# Patient Record
Sex: MALE | Race: WHITE | URBAN - METROPOLITAN AREA
[De-identification: names, ages, dates, MRNs, and addresses within clinical notes are randomized per-mention and may not be internally consistent; named-entity substitution may affect disease eponyms.]

---

## 2019-06-13 ENCOUNTER — APPOINTMENT (OUTPATIENT)
Dept: CT IMAGING | Facility: CLINIC | Age: 64
End: 2019-06-13
Attending: EMERGENCY MEDICINE
Payer: COMMERCIAL

## 2019-06-13 ENCOUNTER — HOSPITAL ENCOUNTER (OUTPATIENT)
Facility: CLINIC | Age: 64
Setting detail: OBSERVATION
Discharge: HOME | End: 2019-06-13
Attending: EMERGENCY MEDICINE | Admitting: INTERNAL MEDICINE
Payer: COMMERCIAL

## 2019-06-13 VITALS
HEIGHT: 70 IN | TEMPERATURE: 97.8 F | OXYGEN SATURATION: 97 % | SYSTOLIC BLOOD PRESSURE: 128 MMHG | WEIGHT: 185 LBS | RESPIRATION RATE: 16 BRPM | BODY MASS INDEX: 26.48 KG/M2 | DIASTOLIC BLOOD PRESSURE: 83 MMHG | HEART RATE: 53 BPM

## 2019-06-13 DIAGNOSIS — N20.0 KIDNEY STONE: ICD-10-CM

## 2019-06-13 LAB
ALBUMIN UR-MCNC: NEGATIVE MG/DL
ANION GAP SERPL CALCULATED.3IONS-SCNC: 7 MMOL/L (ref 3–14)
APPEARANCE UR: CLEAR
BASOPHILS # BLD AUTO: 0 10E9/L (ref 0–0.2)
BASOPHILS NFR BLD AUTO: 0.4 %
BILIRUB UR QL STRIP: NEGATIVE
BUN SERPL-MCNC: 28 MG/DL (ref 7–30)
CALCIUM SERPL-MCNC: 8.8 MG/DL (ref 8.5–10.1)
CHLORIDE SERPL-SCNC: 106 MMOL/L (ref 94–109)
CO2 SERPL-SCNC: 27 MMOL/L (ref 20–32)
COLOR UR AUTO: YELLOW
CREAT SERPL-MCNC: 1.47 MG/DL (ref 0.66–1.25)
DIFFERENTIAL METHOD BLD: NORMAL
EOSINOPHIL # BLD AUTO: 0.1 10E9/L (ref 0–0.7)
EOSINOPHIL NFR BLD AUTO: 1.5 %
ERYTHROCYTE [DISTWIDTH] IN BLOOD BY AUTOMATED COUNT: 13.5 % (ref 10–15)
GFR SERPL CREATININE-BSD FRML MDRD: 50 ML/MIN/{1.73_M2}
GLUCOSE SERPL-MCNC: 103 MG/DL (ref 70–99)
GLUCOSE UR STRIP-MCNC: NEGATIVE MG/DL
HCT VFR BLD AUTO: 42 % (ref 40–53)
HGB BLD-MCNC: 14.3 G/DL (ref 13.3–17.7)
HGB UR QL STRIP: ABNORMAL
IMM GRANULOCYTES # BLD: 0 10E9/L (ref 0–0.4)
IMM GRANULOCYTES NFR BLD: 0.1 %
KETONES UR STRIP-MCNC: NEGATIVE MG/DL
LEUKOCYTE ESTERASE UR QL STRIP: NEGATIVE
LYMPHOCYTES # BLD AUTO: 2.9 10E9/L (ref 0.8–5.3)
LYMPHOCYTES NFR BLD AUTO: 34.2 %
MCH RBC QN AUTO: 29.7 PG (ref 26.5–33)
MCHC RBC AUTO-ENTMCNC: 34 G/DL (ref 31.5–36.5)
MCV RBC AUTO: 87 FL (ref 78–100)
MONOCYTES # BLD AUTO: 1.1 10E9/L (ref 0–1.3)
MONOCYTES NFR BLD AUTO: 12.5 %
MUCOUS THREADS #/AREA URNS LPF: PRESENT /LPF
NEUTROPHILS # BLD AUTO: 4.4 10E9/L (ref 1.6–8.3)
NEUTROPHILS NFR BLD AUTO: 51.3 %
NITRATE UR QL: NEGATIVE
PH UR STRIP: 5 PH (ref 5–7)
PLATELET # BLD AUTO: 304 10E9/L (ref 150–450)
POTASSIUM SERPL-SCNC: 4 MMOL/L (ref 3.4–5.3)
RBC # BLD AUTO: 4.82 10E12/L (ref 4.4–5.9)
RBC #/AREA URNS AUTO: 53 /HPF (ref 0–2)
SODIUM SERPL-SCNC: 140 MMOL/L (ref 133–144)
SOURCE: ABNORMAL
SP GR UR STRIP: 1.03 (ref 1–1.03)
UROBILINOGEN UR STRIP-MCNC: NORMAL MG/DL (ref 0–2)
WBC # BLD AUTO: 8.6 10E9/L (ref 4–11)
WBC #/AREA URNS AUTO: 1 /HPF (ref 0–5)

## 2019-06-13 PROCEDURE — 25000128 H RX IP 250 OP 636: Performed by: STUDENT IN AN ORGANIZED HEALTH CARE EDUCATION/TRAINING PROGRAM

## 2019-06-13 PROCEDURE — 25000132 ZZH RX MED GY IP 250 OP 250 PS 637: Performed by: INTERNAL MEDICINE

## 2019-06-13 PROCEDURE — 80048 BASIC METABOLIC PNL TOTAL CA: CPT | Performed by: EMERGENCY MEDICINE

## 2019-06-13 PROCEDURE — 96376 TX/PRO/DX INJ SAME DRUG ADON: CPT

## 2019-06-13 PROCEDURE — 81001 URINALYSIS AUTO W/SCOPE: CPT | Performed by: EMERGENCY MEDICINE

## 2019-06-13 PROCEDURE — 85025 COMPLETE CBC W/AUTO DIFF WBC: CPT | Performed by: EMERGENCY MEDICINE

## 2019-06-13 PROCEDURE — 99285 EMERGENCY DEPT VISIT HI MDM: CPT | Mod: 25

## 2019-06-13 PROCEDURE — 96374 THER/PROPH/DIAG INJ IV PUSH: CPT

## 2019-06-13 PROCEDURE — G0378 HOSPITAL OBSERVATION PER HR: HCPCS

## 2019-06-13 PROCEDURE — 25000128 H RX IP 250 OP 636: Performed by: INTERNAL MEDICINE

## 2019-06-13 PROCEDURE — 99220 ZZC INITIAL OBSERVATION CARE,LEVL III: CPT | Performed by: INTERNAL MEDICINE

## 2019-06-13 PROCEDURE — 25000128 H RX IP 250 OP 636: Performed by: EMERGENCY MEDICINE

## 2019-06-13 PROCEDURE — 74176 CT ABD & PELVIS W/O CONTRAST: CPT

## 2019-06-13 PROCEDURE — 96375 TX/PRO/DX INJ NEW DRUG ADDON: CPT

## 2019-06-13 PROCEDURE — 25000132 ZZH RX MED GY IP 250 OP 250 PS 637: Performed by: EMERGENCY MEDICINE

## 2019-06-13 RX ORDER — OXYCODONE AND ACETAMINOPHEN 5; 325 MG/1; MG/1
1-2 TABLET ORAL EVERY 4 HOURS PRN
Qty: 20 TABLET | Refills: 0 | Status: SHIPPED | OUTPATIENT
Start: 2019-06-13 | End: 2019-06-13

## 2019-06-13 RX ORDER — SODIUM PHOSPHATE,MONO-DIBASIC 19G-7G/118
1 ENEMA (ML) RECTAL DAILY
COMMUNITY

## 2019-06-13 RX ORDER — ACETAMINOPHEN 325 MG/1
650 TABLET ORAL EVERY 4 HOURS PRN
Status: DISCONTINUED | OUTPATIENT
Start: 2019-06-13 | End: 2019-06-13 | Stop reason: HOSPADM

## 2019-06-13 RX ORDER — NALOXONE HYDROCHLORIDE 0.4 MG/ML
.1-.4 INJECTION, SOLUTION INTRAMUSCULAR; INTRAVENOUS; SUBCUTANEOUS
Status: DISCONTINUED | OUTPATIENT
Start: 2019-06-13 | End: 2019-06-13 | Stop reason: HOSPADM

## 2019-06-13 RX ORDER — ACETAMINOPHEN 500 MG
TABLET ORAL PRN
COMMUNITY

## 2019-06-13 RX ORDER — MORPHINE SULFATE 4 MG/ML
4 INJECTION, SOLUTION INTRAMUSCULAR; INTRAVENOUS
Status: DISCONTINUED | OUTPATIENT
Start: 2019-06-13 | End: 2019-06-13

## 2019-06-13 RX ORDER — CLOPIDOGREL BISULFATE 75 MG/1
75 TABLET ORAL DAILY
COMMUNITY

## 2019-06-13 RX ORDER — MORPHINE SULFATE 4 MG/ML
4 INJECTION, SOLUTION INTRAMUSCULAR; INTRAVENOUS ONCE
Status: COMPLETED | OUTPATIENT
Start: 2019-06-13 | End: 2019-06-13

## 2019-06-13 RX ORDER — AMOXICILLIN 250 MG
1 CAPSULE ORAL 2 TIMES DAILY
Status: DISCONTINUED | OUTPATIENT
Start: 2019-06-13 | End: 2019-06-13 | Stop reason: HOSPADM

## 2019-06-13 RX ORDER — LEVOTHYROXINE SODIUM 125 UG/1
125 TABLET ORAL DAILY
COMMUNITY

## 2019-06-13 RX ORDER — OXYCODONE AND ACETAMINOPHEN 5; 325 MG/1; MG/1
1-2 TABLET ORAL EVERY 4 HOURS PRN
Status: DISCONTINUED | OUTPATIENT
Start: 2019-06-13 | End: 2019-06-13 | Stop reason: HOSPADM

## 2019-06-13 RX ORDER — ATENOLOL 25 MG/1
25 TABLET ORAL DAILY
COMMUNITY

## 2019-06-13 RX ORDER — TAMSULOSIN HYDROCHLORIDE 0.4 MG/1
0.4 CAPSULE ORAL DAILY
Status: DISCONTINUED | OUTPATIENT
Start: 2019-06-13 | End: 2019-06-13 | Stop reason: HOSPADM

## 2019-06-13 RX ORDER — ONDANSETRON 4 MG/1
4 TABLET, ORALLY DISINTEGRATING ORAL EVERY 6 HOURS PRN
Status: DISCONTINUED | OUTPATIENT
Start: 2019-06-13 | End: 2019-06-13 | Stop reason: HOSPADM

## 2019-06-13 RX ORDER — MORPHINE SULFATE 2 MG/ML
2-4 INJECTION, SOLUTION INTRAMUSCULAR; INTRAVENOUS
Status: DISCONTINUED | OUTPATIENT
Start: 2019-06-13 | End: 2019-06-13 | Stop reason: HOSPADM

## 2019-06-13 RX ORDER — TAMSULOSIN HYDROCHLORIDE 0.4 MG/1
0.4 CAPSULE ORAL ONCE
Status: COMPLETED | OUTPATIENT
Start: 2019-06-13 | End: 2019-06-13

## 2019-06-13 RX ORDER — SODIUM CHLORIDE 9 MG/ML
INJECTION, SOLUTION INTRAVENOUS CONTINUOUS
Status: DISCONTINUED | OUTPATIENT
Start: 2019-06-13 | End: 2019-06-13 | Stop reason: HOSPADM

## 2019-06-13 RX ORDER — ONDANSETRON 2 MG/ML
4 INJECTION INTRAMUSCULAR; INTRAVENOUS EVERY 30 MIN PRN
Status: DISCONTINUED | OUTPATIENT
Start: 2019-06-13 | End: 2019-06-13

## 2019-06-13 RX ORDER — LIDOCAINE 40 MG/G
CREAM TOPICAL
Status: DISCONTINUED | OUTPATIENT
Start: 2019-06-13 | End: 2019-06-13 | Stop reason: HOSPADM

## 2019-06-13 RX ORDER — ONDANSETRON 2 MG/ML
4 INJECTION INTRAMUSCULAR; INTRAVENOUS EVERY 6 HOURS PRN
Status: DISCONTINUED | OUTPATIENT
Start: 2019-06-13 | End: 2019-06-13 | Stop reason: HOSPADM

## 2019-06-13 RX ORDER — OXYCODONE AND ACETAMINOPHEN 5; 325 MG/1; MG/1
1-2 TABLET ORAL EVERY 4 HOURS PRN
Qty: 25 TABLET | Refills: 0 | Status: SHIPPED | OUTPATIENT
Start: 2019-06-13 | End: 2019-06-13

## 2019-06-13 RX ORDER — KETOROLAC TROMETHAMINE 15 MG/ML
15 INJECTION, SOLUTION INTRAMUSCULAR; INTRAVENOUS ONCE
Status: COMPLETED | OUTPATIENT
Start: 2019-06-13 | End: 2019-06-13

## 2019-06-13 RX ORDER — AMOXICILLIN 250 MG
2 CAPSULE ORAL 2 TIMES DAILY
Status: DISCONTINUED | OUTPATIENT
Start: 2019-06-13 | End: 2019-06-13 | Stop reason: HOSPADM

## 2019-06-13 RX ORDER — ACETAMINOPHEN 650 MG/1
650 SUPPOSITORY RECTAL EVERY 4 HOURS PRN
Status: DISCONTINUED | OUTPATIENT
Start: 2019-06-13 | End: 2019-06-13 | Stop reason: HOSPADM

## 2019-06-13 RX ORDER — POLYETHYLENE GLYCOL 3350 17 G/17G
1 POWDER, FOR SOLUTION ORAL DAILY
COMMUNITY

## 2019-06-13 RX ORDER — ALLOPURINOL 300 MG/1
150 TABLET ORAL DAILY
COMMUNITY

## 2019-06-13 RX ORDER — MULTIVIT WITH MINERALS/LUTEIN
1 TABLET ORAL DAILY
COMMUNITY

## 2019-06-13 RX ORDER — ONDANSETRON 2 MG/ML
4 INJECTION INTRAMUSCULAR; INTRAVENOUS ONCE
Status: COMPLETED | OUTPATIENT
Start: 2019-06-13 | End: 2019-06-13

## 2019-06-13 RX ORDER — HYDROMORPHONE HYDROCHLORIDE 1 MG/ML
0.5 INJECTION, SOLUTION INTRAMUSCULAR; INTRAVENOUS; SUBCUTANEOUS
Status: DISCONTINUED | OUTPATIENT
Start: 2019-06-13 | End: 2019-06-13

## 2019-06-13 RX ORDER — OXYCODONE AND ACETAMINOPHEN 5; 325 MG/1; MG/1
1-2 TABLET ORAL EVERY 6 HOURS PRN
Qty: 25 TABLET | Refills: 0 | Status: SHIPPED | OUTPATIENT
Start: 2019-06-13

## 2019-06-13 RX ADMIN — HYDROMORPHONE HYDROCHLORIDE 0.5 MG: 1 INJECTION, SOLUTION INTRAMUSCULAR; INTRAVENOUS; SUBCUTANEOUS at 02:08

## 2019-06-13 RX ADMIN — KETOROLAC TROMETHAMINE 15 MG: 15 INJECTION, SOLUTION INTRAMUSCULAR; INTRAVENOUS at 13:03

## 2019-06-13 RX ADMIN — OXYCODONE HYDROCHLORIDE AND ACETAMINOPHEN 2 TABLET: 5; 325 TABLET ORAL at 11:20

## 2019-06-13 RX ADMIN — KETOROLAC TROMETHAMINE 15 MG: 15 INJECTION, SOLUTION INTRAMUSCULAR; INTRAVENOUS at 00:36

## 2019-06-13 RX ADMIN — MORPHINE SULFATE 4 MG: 2 INJECTION, SOLUTION INTRAMUSCULAR; INTRAVENOUS at 05:48

## 2019-06-13 RX ADMIN — MORPHINE SULFATE 4 MG: 4 INJECTION INTRAVENOUS at 04:15

## 2019-06-13 RX ADMIN — TAMSULOSIN HYDROCHLORIDE 0.4 MG: 0.4 CAPSULE ORAL at 02:31

## 2019-06-13 RX ADMIN — MORPHINE SULFATE 4 MG: 4 INJECTION INTRAVENOUS at 00:37

## 2019-06-13 RX ADMIN — ONDANSETRON 4 MG: 2 INJECTION INTRAMUSCULAR; INTRAVENOUS at 00:37

## 2019-06-13 RX ADMIN — TAMSULOSIN HYDROCHLORIDE 0.4 MG: 0.4 CAPSULE ORAL at 08:37

## 2019-06-13 RX ADMIN — SENNOSIDES AND DOCUSATE SODIUM 1 TABLET: 8.6; 5 TABLET ORAL at 08:37

## 2019-06-13 RX ADMIN — MORPHINE SULFATE 4 MG: 4 INJECTION INTRAVENOUS at 02:31

## 2019-06-13 RX ADMIN — OXYCODONE HYDROCHLORIDE AND ACETAMINOPHEN 2 TABLET: 5; 325 TABLET ORAL at 06:41

## 2019-06-13 ASSESSMENT — ENCOUNTER SYMPTOMS
DYSURIA: 0
HEMATURIA: 0
NAUSEA: 0
FLANK PAIN: 1
VOMITING: 0

## 2019-06-13 ASSESSMENT — MIFFLIN-ST. JEOR: SCORE: 1645.4

## 2019-06-13 NOTE — ED PROVIDER NOTES
..RECEIVING UNIT ED HANDOFF REVIEW    ED Nurse Handoff Report was reviewed by: Gudelia Aaron on June 13, 2019 at 3:26 AM

## 2019-06-13 NOTE — PLAN OF CARE
Pt. A&o, vss, up independently, taking percocet for pain, discharge instruction reviewed, discharge medication given and pt. discharge to home at 143 pm.

## 2019-06-13 NOTE — ED PROVIDER NOTES
"  History     Chief Complaint:  Flank pain     HPI   Jose Juan Araujo is a 63 year old male with a history of kidney stones, diverticulitis, myocardial infarction, among others who presents with flank pain. The patient noticed sharp left sided flank pain 1.5 hours ago that he is pretty sure is a kidney stone. He originally thought it might be diverticulitis but as the pain went on it reminded him of his past kidney stones. Due to concern, the patient visited the ED for evaluation and treatment. Upon arrival, the patient states he has had normal bowel movements.  He has had some nausea but no vomiting.  The patient denies dysuria or hematuria. The patient is from [place] and is here in town for work. He had a colectomy done this last November for diverticulosis and a kidney stone this last October.    Allergies:  The patient has no known drug allergies.    Medications:    Plavix  Others but not listed in EHR*    Past Medical History:    Myocardial infarction   Kidney stones  Diverticulitis  Others but not listed in EHR*    Past Surgical History:    3 cardiac stents   Colectomy    Family History:    No past pertinent family history.    Social History:  Marital Status:  Unknown   Smoker:   Unknown   Alcohol:   Unknown   Drugs:   Unknown      Review of Systems   Gastrointestinal: Negative for nausea and vomiting.   Genitourinary: Positive for flank pain. Negative for dysuria and hematuria.   All other systems reviewed and are negative.    Physical Exam     Patient Vitals for the past 24 hrs:   BP Temp Temp src Pulse Heart Rate Resp SpO2 Height Weight   06/13/19 0200 -- -- -- -- -- -- 96 % -- --   06/13/19 0110 124/79 -- -- 57 -- -- 95 % -- --   06/13/19 0023 134/83 98.1  F (36.7  C) Temporal 74 74 20 98 % 1.778 m (5' 10\") 83.9 kg (185 lb)     Physical Exam  Nursing note and vitals reviewed.  Constitutional:  Oriented to person, place, and time. Cooperative.  Appears uncomfortable.  HENT:   Nose:    Nose normal. "   Mouth/Throat:   Mucous membranes are normal.   Eyes:    Conjunctivae normal and EOM are normal.      Pupils are equal, round, and reactive to light.   Neck:    Trachea normal.   Cardiovascular:  Normal rate, regular rhythm, normal heart sounds and normal pulses. No murmur heard.  Pulmonary/Chest:  Effort normal and breath sounds normal.   Abdominal:   Soft. Normal appearance and bowel sounds are normal.      There is some tenderness to palpation in the left lower quadrant as well as some left CVA tenderness to palpation.  There is no rebound.   Musculoskeletal:  Extremities atraumatic x 4.   Lymphadenopathy:  No cervical adenopathy.   Neurological:   Alert and oriented to person, place, and time. Normal strength.      No cranial nerve deficit or sensory deficit. GCS eye subscore is 4. GCS verbal subscore is 5. GCS motor subscore is 6.   Skin:    Skin is intact. No rash noted.   Psychiatric:   Normal mood and affect.    Emergency Department Course   Imaging:  Radiographic findings were communicated with the patient who voiced understanding of the findings.    CT Abdomen/Pelvis with IV contrast:   1. There is a 0.5 cm mid left ureteral stone causing mild  hydronephrosis.  2. Bilateral intrarenal stones.  3. Colonic diverticula without acute diverticulitis. as per radiology.    Laboratory:  UA with Microscopic: blood moderate, RBC 53, mucous present, o/w WNL  BMP: Glucose 103, creatinine 1.47, GFR 50, o/w WNL   CBC: WBC: 8.6, HGB: 14.3, PLT: 304    Interventions:  0036: Toradol 15 mg IV  0037: Morphine 4 mg IV  0037: Zofran 4 mg IV  0208: Dilaudid 0.5 mg IV  0231: Flomax 0.4 mg PO  0231: Morphine 4 mg IV    Emergency Department Course:  0043 I performed an exam of the patient as documented above.   (0230)  I consulted with Dr. Longo of the hospitalist services. They are in agreement to accept the patient for admission.  0235 I rechecked the patient and discussed the results of their workup thus far.    Findings and  plan explained to the Patient who consents to admission. Discussed the patient with Dr. Longo, who will admit the patient to a OBS bed for further monitoring, evaluation, and treatment.    Impression & Plan    Medical Decision Making:  This is a 63-year-old male came in with left flank pain.  Based on his presentation as well as his history, I felt it was likely that a kidney stone will be the cause of his pain.  I proceeded with the above work-up, and he does in fact have a kidney stone as a cause of his symptoms.  He is quite uncomfortable here and is from out of town.  He prefers to stay overnight for pain control, which I think is reasonable.  I subsequently spoke with Dr. Longo, who will be taking care of the patient.    Diagnosis:    ICD-10-CM    1. 5 mm Mid left ureteral kidney stone N20.0    2. Multiple Intra-renal kidney stones N20.0      Disposition:  Admitted to OBS    Scribe Disclosure:  I, Chuck Maldonado, am serving as a scribe on 6/13/2019 at 12:43 AM to personally document services performed by Bernard Charles MD based on my observations and the provider's statements to me.     Chuck Maldonado  6/13/2019    EMERGENCY DEPARTMENT       Bernard Charles MD  06/13/19 0439

## 2019-06-13 NOTE — PLAN OF CARE
Patient Arrived from ED @ 04:40 on the floor. VSS on RA. Up independently . A&O x4. Iv infusing. BS active, NPO . C/o flank pain 6/10 received Iv morphine . Percocet also available. Denies nausea . Will continue monitoring.

## 2019-06-13 NOTE — DISCHARGE SUMMARY
[facility]  Hospitalist Discharge Summary       Date of Admission:  6/13/2019  Date of Discharge:  6/13/2019  Discharging Provider: Wilver Valdez MD      Discharge Diagnoses     Nephrolithiasis    Follow-ups Needed After Discharge   Follow-up Appointments     Follow-up and recommended labs and tests       Follow up with primary care provider, Physician No Ref-Primary, within 7   days for hospital follow- up.  The following labs/tests are recommended:   None.           Unresulted Labs Ordered in the Past 30 Days of this Admission     No orders found from 4/14/2019 to 6/14/2019.        Discharge Disposition   Discharged to home  Condition at discharge: Stable    Hospital Course       Mr. Jose Juan Araujo is a very pleasant 63-year-old gentleman with a past medical history of hypertension, dyslipidemia, coronary artery disease with history of myocardial infarction and stent placement in 2000, history of gout, recurrent kidney stones, status post lithotripsies and stent placement, history of recurrent diverticulitis, status post colectomy and chronic kidney disease, stage III, who came in for evaluation of left lower quadrant pain.      PLAN:   1.  Renal colic.   2.  Obstructing left ureteral stone.   3.  Mild left hydronephrosis.   4.  Bilateral intrarenal stones.   -- He has history of recurrent stones.  Apparently, he underwent lithotripsy and stent placement in the past.  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  He started having left lower quadrant pain, which he initially thought it was a bout of diverticulitis, but the pain became severe and excruciating, more similar to his prior kidney stones/colic.  CT of the abdomen and pelvis did not show any diverticulitis, but it did show 0.5 cm mid left ureteral stone causing mild hydronephrosis.  His UA shows some microscopic hematuria, but no evidence of infection.  He received a few doses of morphine and Dilaudid in the ER which  improved his pain.  Urology consult requested, felt okay to be discharged with urology follow up in [place]. Percocet at discharge.    5.  Hypertension.   6.  Dyslipidemia.     7.  History of coronary artery disease with a history of MI and stents placed in the past.  He had been maintained on aspirin and Plavix, as well as atenolol and Repatha.  The patient denies any cardiac symptoms at this time.  No chest pain, no shortness of breath.  The stents had been placed back in 2000. Resume ASA/Plavix    8.  Hypothyroidism:Resume prior to admission levothyroxine    9.  Chronic kidney disease, stage III:  His creatinine now is 1.47.  As per Care Everywhere, his prior creatinine level varied between 1.6-1.9, so currently his creatinine is at baseline.  He does have mild left hydronephrosis on CAT scan. Cr wnl on discharge.  10.  History of gout: Resume prior to admission allopurinol at this time.   11.  History of recurrent diverticulitis:  He did present with pain in his left lower quadrant, but a CT of the abdomen and pelvis did not show any evidence of diverticulitis.       Consultations This Hospital Stay   UROLOGY IP CONSULT  UROLOGY IP CONSULT  UROLOGY IP CONSULT    Code Status   Full Code    Time Spent on this Encounter   I, Wilver Valdez, personally saw the patient today and spent greater than 30 minutes discharging this patient.       Wilver Valdez MD  [facility]  ______________________________________________________________________    Physical Exam   Vital Signs: Temp: 97.8  F (36.6  C) Temp src: Oral BP: 128/83 Pulse: 53 Heart Rate: 53 Resp: 16 SpO2: 97 % O2 Device: None (Room air)    Weight: 185 lbs 0 oz    Primary Care Physician   Physician No Ref-Primary    Discharge Orders      Reason for your hospital stay    You had a kidney stone and needed a Urology consult     Follow-up and recommended labs and tests     Follow up with primary care provider, Physician No Ref-Primary, within 7 days for  hospital follow- up.  The following labs/tests are recommended: None.     Activity    Your activity upon discharge: activity as tolerated     Diet    Follow this diet upon discharge: Orders Placed This Encounter      Regular Diet Adult       Significant Results and Procedures   Most Recent 3 CBC's:  Recent Labs   Lab Test 06/13/19  0030   WBC 8.6   HGB 14.3   MCV 87        Most Recent 3 BMP's:  Recent Labs   Lab Test 06/13/19  0030      POTASSIUM 4.0   CHLORIDE 106   CO2 27   BUN 28   CR 1.47*   ANIONGAP 7   IDANIA 8.8   *     Most Recent 2 LFT's:No lab results found.  Most Recent 3 INR's:No lab results found.  Most Recent Urinalysis:  Recent Labs   Lab Test 06/13/19  0103   COLOR Yellow   APPEARANCE Clear   URINEGLC Negative   URINEBILI Negative   URINEKETONE Negative   SG 1.026   UBLD Moderate*   URINEPH 5.0   PROTEIN Negative   NITRITE Negative   LEUKEST Negative   RBCU 53*   WBCU 1   ,   Results for orders placed or performed during the hospital encounter of 06/13/19   CT Abdomen Pelvis w/o Contrast    Narrative    CT ABDOMEN PELVIS W/O CONTRAST  6/13/2019 1:56 AM     HISTORY: Flank pain, stone disease suspected.    TECHNIQUE: Noncontrast CT abdomen and pelvis was performed. Radiation  dose for this scan was reduced using automated exposure control,  adjustment of the mA and/or kV according to patient size, or iterative  reconstruction technique.    COMPARISON: None.    FINDINGS:  Abdomen: There is mild dilatation of the left renal collecting system  and proximal ureter caused by a 0.5 cm mid ureteral stone at the level  of the iliac crests. There are five stones within the left kidney  measuring up to 0.5 cm. There are four tiny stones in the right kidney  measuring up to 0.2 cm. No right ureteral stone or hydronephrosis.    The lung bases are unremarkable. Evaluation of the solid abdominal  organs is limited by the lack of intravenous contrast. The liver,  spleen, gallbladder, pancreas and  adrenal glands are normal in  appearance. There is no abdominal or pelvic lymph node enlargement.    Pelvis: Postoperative changes in the sigmoid colon. Colonic  diverticula without acute diverticulitis. No bowel obstruction or  inflammation. No free intraperitoneal gas or fluid. Degenerative  disease in the spine.      Impression    IMPRESSION:  1. There is a 0.5 cm mid left ureteral stone causing mild  hydronephrosis.  2. Bilateral intrarenal stones.  3. Colonic diverticula without acute diverticulitis.    KENYA BLACKBURN MD       Discharge Medications   Current Discharge Medication List      START taking these medications    Details   oxyCODONE-acetaminophen (PERCOCET) 5-325 MG tablet Take 1-2 tablets by mouth every 6 hours as needed for pain  Qty: 25 tablet, Refills: 0    Associated Diagnoses: Kidney stone         CONTINUE these medications which have NOT CHANGED    Details   acetaminophen (TYLENOL) 500 MG tablet Take by mouth as needed for mild pain      allopurinol (ZYLOPRIM) 300 MG tablet Take 150 mg by mouth daily      ASCORBIC ACID PO Take by mouth daily      aspirin (ASA) 325 MG EC tablet Take 325 mg by mouth daily      atenolol (TENORMIN) 25 MG tablet Take 25 mg by mouth daily      clopidogrel (PLAVIX) 75 MG tablet Take 75 mg by mouth daily      Coenzyme Q10 (Q-10 CO-ENZYME PO) Take by mouth daily      evolocumab (REPATHA) 140 MG/ML prefilled syringe Inject 140 mg Subcutaneous every 14 days      glucosamine-chondroitin 500-400 MG CAPS per capsule Take 1 capsule by mouth daily      hypromellose-dextran (ARTIFICAL TEARS) 0.1-0.3 % ophthalmic solution as needed for dry eyes      IRON PO Take by mouth daily      levothyroxine (SYNTHROID/LEVOTHROID) 125 MCG tablet Take 125 mcg by mouth daily      multivitamin (CENTRUM SILVER) tablet Take 1 tablet by mouth daily      polyethylene glycol (MIRALAX/GLYCOLAX) packet Take 1 packet by mouth daily      Probiotic Product (ALIGN PO) Take 1 capsule by mouth daily       ranitidine (ZANTAC) 150 MG tablet Take 150 mg by mouth daily      VITAMIN E PO Take by mouth daily           Allergies   No Known Allergies

## 2019-06-13 NOTE — H&P
Admitted:     06/13/2019      PRIMARY CARE PHYSICIAN:  Not in [facility].      CHIEF COMPLAINT:  Left lower abdominal pain.      HISTORY OF PRESENT ILLNESS:  Had been obtained from the patient, who is a good historian.  I also discussed with the ER attending, Dr. Charles.      Mr. Jose Juan Araujo is a very pleasant 63-year-old gentleman with past medical history of coronary artery disease, status post myocardial infarction and 3 stents placed many years ago, history of recurrent kidney stones, status post lithotripsy and stent placement, history of recurrent diverticulitis, status post partial colectomy and gout, who came in for evaluation of left lower quadrant pain.  The patient lives in [place].  He is here in the [facility] [employer].  He does have a prior history of kidney stones.  He states that last night he tried to go to bed around 8-9 p.m. when he started having some left lower quadrant pain that he initially thought was recurrent diverticulitis, but subsequently the pain got progressively worse.  It was constant, excruciating, 10/10 intensity, that was more similar to his prior kidney colics.  He did not take any medications at home.  Upon further questioning, he did report feeling some nausea, but he did not vomit.  He denies any fevers, no chest pain, no shortness of breath, no dizziness.  He denies any dysuria, no increased urinary frequency, no hematuria.  He denies any chest pain, no shortness of breath, no headaches, no leg swelling.      In ER, his initial vitals showed a blood pressure of 134/83, heart rate was 74, respiratory rate 20, oxygen saturation 98% on room air and temperature 98.1.  He was seen by Dr. Charles.  His initial blood work showed unremarkable CBC, no leukocytosis.  White blood cells of 8.6, hemoglobin 14.3, hematocrit 42, platelet count 304.  His BMP with a sodium 140, potassium 4, chloride 106, bicarb 27, BUN 20, creatinine 1.47, anion gap of 7, calcium 8.8.   His UA showed red blood cells of 53, moderate blood and white blood cells of 1, negative leukocyte esterase and negative nitrites.  He did have a CT of the abdomen and pelvis without contrast which showed a 0.5 cm mid left ureteral stone causing mild hydronephrosis.  There are bilateral intrarenal stones.  There is also colonic diverticula without acute diverticulitis.        In ER, he received some IV fluids, 1 dose of Dilaudid 0.5 mg IV and a couple of doses of morphine, 1 dose of Toradol, 1 dose of Zofran and 1 dose of Flomax 0.4 mg p.o.  The patient reported some improvement of the pain after he was given IV pain medications, but the pain recurred soon thereafter, the reason for which Hospitalist Service was called regarding the admission.      PAST MEDICAL HISTORY:   1.  History of recurrent kidney stones, status post lithotripsies and stent placement in the past.  He lives in [place].   2.  History of hypertension.   3.  Dyslipidemia.   4.  History of coronary artery disease with history of myocardial infarction and 3 stents placed in 2000.   5.  Gastroesophageal reflux disease.   6.  Hypothyroidism.   7.  Recurrent diverticulitis of the colon, status post laparoscopic sigmoid resection.   8.  History of gout.      PAST SURGICAL HISTORY:   1.  Angiogram with stent placement in 2000.   2.  Tonsillectomy.   3.  Left knee surgery in childhood.   4.  Cystoscopy with stent placement in the past and lithotripsy.      SOCIAL HISTORY:  He denies smoking.  He denies illicit drug abuse.  He drinks alcohol occasionally.      FAMILY HISTORY:  Reviewed and is noncontributory to the current admission.      PRIOR TO ADMISSION MEDICATIONS:  Needs to be verified by the Pharmacy.     1.  Aspirin 325 mg p.o. daily.   2.  Plavix 75 mg p.o. daily.   3.  Levothyroxine 25 mcg p.o. daily.   4.  Allopurinol 150 mg p.o. daily.   5.  Atenolol 25 mg p.o. daily.   6.  Repatha 140 mg p.o. daily.   7.  Prilosec 150 mg p.o. twice daily.       ALLERGIES:  NO KNOWN DRUG ALLERGIES.      REVIEW OF SYSTEMS:  A 10-point review of systems was conducted and it was negative, except for pertinent positives mentioned in history of present illness.      PHYSICAL EXAMINATION:   VITAL SIGNS:  Blood pressure is 124/79, heart rate 76, respiratory rate 16, oxygen saturation 96% on room air and temperature 98.1.   GENERAL:  The patient is awake, alert, no acute distress at the time of my examination.   HEENT:  Head is normocephalic, atraumatic.  Pupils are equal, round and reactive to light.  Oral mucosa is mildly dry.   NECK:  Supple.  No cervical lymphadenopathy, no thyromegaly.   CHEST:  There is bilateral air entry.  No wheezing, no rales, no crackles.   CARDIOVASCULAR:  There is normal S1, S2, regular rate and rhythm.  No murmurs, no rubs.   ABDOMEN:  Soft, mildly tender to palpation in left lower quadrant.  No rebound, no guarding.  Bowel sounds are present.   EXTREMITIES:  There is no leg swelling, no calf tenderness.  2+ peripheral pulses are palpable.   SKIN:  Intact.  No rash, no cyanosis.   NEUROLOGIC:  The patient is awake, alert, oriented to self, place and time.  There are no focal neurological deficits.   PSYCHIATRIC:  Normal mood, normal affect.   MUSCULOSKELETAL:  He moves all extremities freely.  There are no obvious joint deformities.      LABORATORY:  Reviewed and dictated above.      ASSESSMENT:  Mr. Jose Juan Araujo is a very pleasant 63-year-old gentleman with a past medical history of hypertension, dyslipidemia, coronary artery disease with history of myocardial infarction and stent placement in 2000, history of gout, recurrent kidney stones, status post lithotripsies and stent placement, history of recurrent diverticulitis, status post colectomy and chronic kidney disease, stage III, who came in for evaluation of left lower quadrant pain.      PLAN:   1.  Renal colic.   2.  Obstructing left ureteral stone.   3.  Mild left hydronephrosis.   4.   Bilateral intrarenal stones.   -- He has history of recurrent stones.  Apparently, he underwent lithotripsy and stent placement in the past.  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  He started having left lower quadrant pain, which he initially thought it was a bout of diverticulitis, but the pain became severe and excruciating, more similar to his prior kidney stones/colic.  CT of the abdomen and pelvis did not show any diverticulitis, but it did show 0.5 cm mid left ureteral stone causing mild hydronephrosis.  His UA shows some microscopic hematuria, but no evidence of infection.  He received a few doses of morphine and Dilaudid in the ER which improved his pain only temporarily.  The patient is admitted under observational status for pain control.  We will continue with aggressive IV hydration, normal saline at 125 mL per hour.  We will start him on Flomax, and he will have antiemetics and pain medications available p.r.n.   Order to strain the urine has been placed, hoping that he might pass the stone.  Urology consult requested.  The patient is hoping that the pain will be better controlled later on today so he can be discharged, and he is planning to return back to [place].   5.  Hypertension.   6.  Dyslipidemia.   7.  History of coronary artery disease with a history of MI and stents placed in the past.    He had been maintained on aspirin and Plavix, as well as atenolol and Repatha.  The patient denies any cardiac symptoms at this time.  No chest pain, no shortness of breath.  The stents had been placed back in 2000.  I think it is okay to hold his aspirin and Plavix at this time in case he may need to go to OR.  It is actually okay to hold all his medications, including Repatha.  Given his observational status, he will start taking his medications later on today if he will be discharged.  If he will stay in the hospital, please resume his prior to admission atenolol.   8.   Hypothyroidism:  I am holding his prior to admission levothyroxine, given observational status.  He will resume taking it after discharge.   9.  Chronic kidney disease, stage III:  His creatinine now is 1.47.  As per Care Everywhere, his prior creatinine level varied between 1.6-1.9, so currently his creatinine is at baseline.  He does have mild left hydronephrosis on CAT scan.  We will monitor his kidney function during this hospitalization.   10.  History of gout:  I am holding his prior to admission allopurinol at this time.   11.  History of recurrent diverticulitis:  He did present with pain in his left lower quadrant, but a CT of the abdomen and pelvis did not show any evidence of diverticulitis.     12.  Deep venous thrombosis prophylaxis:  Encourage ambulation as I anticipate a short hospital stay.   13.  Code status:  Discussed with the patient and patient is FULL CODE.   14.  Disposition:  Admit under observational status.  I anticipate patient may be discharged later on today or tomorrow, pending pain control and Urology recommendations.         GLORY SCHWARTZ MD             D: 2019   T: 2019   MT: JAVIER      Name:     DEJAH LE   MRN:      [id number]        Account:      [id number]   :      1955        Admitted:     2019                   Document: [id number]

## 2019-06-13 NOTE — PHARMACY-ADMISSION MEDICATION HISTORY
Admission medication history interview status for the 6/13/2019  admission is complete. See EPIC admission navigator for prior to admission medications     Medication history source reliability:Good    Actions taken by pharmacist (provider contacted, etc): spoke to pt and called [facility] to confirm Synthroid dose     Additional medication history information not noted on PTA med list :None    Medication reconciliation/reorder completed by provider prior to medication history? No    Time spent in this activity: 20 minutes    Prior to Admission medications    Medication Sig Last Dose Taking? Auth Provider   acetaminophen (TYLENOL) 500 MG tablet Take by mouth as needed for mild pain prn med Yes Unknown, Entered By History   allopurinol (ZYLOPRIM) 300 MG tablet Take 150 mg by mouth daily 6/12/2019 at Unknown time Yes Unknown, Entered By History   ASCORBIC ACID PO Take by mouth daily 6/12/2019 at Unknown time Yes Unknown, Entered By History   aspirin (ASA) 325 MG EC tablet Take 325 mg by mouth daily 6/12/2019 at Unknown time Yes Unknown, Entered By History   atenolol (TENORMIN) 25 MG tablet Take 25 mg by mouth daily 6/12/2019 at Unknown time Yes Unknown, Entered By History   clopidogrel (PLAVIX) 75 MG tablet Take 75 mg by mouth daily 6/12/2019 at Unknown time Yes Unknown, Entered By History   Coenzyme Q10 (Q-10 CO-ENZYME PO) Take by mouth daily 6/12/2019 at Unknown time Yes Unknown, Entered By History   evolocumab (REPATHA) 140 MG/ML prefilled syringe Inject 140 mg Subcutaneous every 14 days 6/9/2019 Yes Unknown, Entered By History   glucosamine-chondroitin 500-400 MG CAPS per capsule Take 1 capsule by mouth daily 6/12/2019 at Unknown time Yes Unknown, Entered By History   hypromellose-dextran (ARTIFICAL TEARS) 0.1-0.3 % ophthalmic solution as needed for dry eyes prn med Yes Unknown, Entered By History   IRON PO Take by mouth daily 6/12/2019 at Unknown time Yes Unknown, Entered By History   levothyroxine  (SYNTHROID/LEVOTHROID) 125 MCG tablet Take 125 mcg by mouth daily 6/12/2019 at Unknown time Yes Unknown, Entered By History   multivitamin (CENTRUM SILVER) tablet Take 1 tablet by mouth daily 6/12/2019 at Unknown time Yes Unknown, Entered By History   polyethylene glycol (MIRALAX/GLYCOLAX) packet Take 1 packet by mouth daily 6/12/2019 at Unknown time Yes Unknown, Entered By History   Probiotic Product (ALIGN PO) Take 1 capsule by mouth daily 6/12/2019 at Unknown time Yes Unknown, Entered By History   ranitidine (ZANTAC) 150 MG tablet Take 150 mg by mouth daily 6/12/2019 at Unknown time Yes Unknown, Entered By History   VITAMIN E PO Take by mouth daily 6/12/2019 at Unknown time Yes Unknown, Entered By History   Cassandra Meyers, PharmD

## 2019-06-13 NOTE — ED NOTES
"[facility]  ED Nurse Handoff Report    ED Chief complaint: Flank Pain      ED Diagnosis:   Final diagnoses:   5 mm Mid left ureteral kidney stone   Multiple Intra-renal kidney stones       Code Status: Full Code    Allergies: No Known Allergies    Activity level - Baseline/Home:  Independent  Activity Level - Current:   Independent    Patient's Preferred language: English    Needed?: No    Isolation: No  Infection: Not Applicable  Bariatric?: No    Vital Signs:   Vitals:    06/13/19 0023 06/13/19 0110 06/13/19 0200   BP: 134/83 124/79    Pulse: 74 57    Resp: 20     Temp: 98.1  F (36.7  C)     TempSrc: Temporal     SpO2: 98% 95% 96%   Weight: 83.9 kg (185 lb)     Height: 1.778 m (5' 10\")         Cardiac Rhythm: ,        Pain level: 0-10 Pain Scale: 9    Is this patient confused?: No   Does this patient have a guardian?  No         If yes, is there guardianship documents in the Epic \"Code/ACP\" activity?  No         Guardian Notified?  N/A  [facility] - Suicide Severity Rating Scale Completed?  Yes  If yes, what color did the patient score?  N/A    Patient Report: Initial Complaint: Pt here with left flank pain. Patient here from [place]. Has a history of kidney stones.   Focused Assessment: Pt complains of left flank pain. Denies nausea, vomiting, and dysuria.   Tests Performed: CT, labs  Abnormal Results:   Results for orders placed or performed during the hospital encounter of 06/13/19   CT Abdomen Pelvis w/o Contrast    Narrative    CT ABDOMEN PELVIS W/O CONTRAST  6/13/2019 1:56 AM     HISTORY: Flank pain, stone disease suspected.    TECHNIQUE: Noncontrast CT abdomen and pelvis was performed. Radiation  dose for this scan was reduced using automated exposure control,  adjustment of the mA and/or kV according to patient size, or iterative  reconstruction technique.    COMPARISON: None.    FINDINGS:  Abdomen: There is mild dilatation of the left renal collecting system  and proximal ureter " caused by a 0.5 cm mid ureteral stone at the level  of the iliac crests. There are five stones within the left kidney  measuring up to 0.5 cm. There are four tiny stones in the right kidney  measuring up to 0.2 cm. No right ureteral stone or hydronephrosis.    The lung bases are unremarkable. Evaluation of the solid abdominal  organs is limited by the lack of intravenous contrast. The liver,  spleen, gallbladder, pancreas and adrenal glands are normal in  appearance. There is no abdominal or pelvic lymph node enlargement.    Pelvis: Postoperative changes in the sigmoid colon. Colonic  diverticula without acute diverticulitis. No bowel obstruction or  inflammation. No free intraperitoneal gas or fluid. Degenerative  disease in the spine.      Impression    IMPRESSION:  1. There is a 0.5 cm mid left ureteral stone causing mild  hydronephrosis.  2. Bilateral intrarenal stones.  3. Colonic diverticula without acute diverticulitis.   CBC with platelets differential   Result Value Ref Range    WBC 8.6 4.0 - 11.0 10e9/L    RBC Count 4.82 4.4 - 5.9 10e12/L    Hemoglobin 14.3 13.3 - 17.7 g/dL    Hematocrit 42.0 40.0 - 53.0 %    MCV 87 78 - 100 fl    MCH 29.7 26.5 - 33.0 pg    MCHC 34.0 31.5 - 36.5 g/dL    RDW 13.5 10.0 - 15.0 %    Platelet Count 304 150 - 450 10e9/L    Diff Method Automated Method     % Neutrophils 51.3 %    % Lymphocytes 34.2 %    % Monocytes 12.5 %    % Eosinophils 1.5 %    % Basophils 0.4 %    % Immature Granulocytes 0.1 %    Absolute Neutrophil 4.4 1.6 - 8.3 10e9/L    Absolute Lymphocytes 2.9 0.8 - 5.3 10e9/L    Absolute Monocytes 1.1 0.0 - 1.3 10e9/L    Absolute Eosinophils 0.1 0.0 - 0.7 10e9/L    Absolute Basophils 0.0 0.0 - 0.2 10e9/L    Abs Immature Granulocytes 0.0 0 - 0.4 10e9/L   Basic metabolic panel   Result Value Ref Range    Sodium 140 133 - 144 mmol/L    Potassium 4.0 3.4 - 5.3 mmol/L    Chloride 106 94 - 109 mmol/L    Carbon Dioxide 27 20 - 32 mmol/L    Anion Gap 7 3 - 14 mmol/L    Glucose  103 (H) 70 - 99 mg/dL    Urea Nitrogen 28 7 - 30 mg/dL    Creatinine 1.47 (H) 0.66 - 1.25 mg/dL    GFR Estimate 50 (L) >60 mL/min/[1.73_m2]    GFR Estimate If Black 58 (L) >60 mL/min/[1.73_m2]    Calcium 8.8 8.5 - 10.1 mg/dL   UA with Microscopic   Result Value Ref Range    Color Urine Yellow     Appearance Urine Clear     Glucose Urine Negative NEG^Negative mg/dL    Bilirubin Urine Negative NEG^Negative    Ketones Urine Negative NEG^Negative mg/dL    Specific Gravity Urine 1.026 1.003 - 1.035    Blood Urine Moderate (A) NEG^Negative    pH Urine 5.0 5.0 - 7.0 pH    Protein Albumin Urine Negative NEG^Negative mg/dL    Urobilinogen mg/dL Normal 0.0 - 2.0 mg/dL    Nitrite Urine Negative NEG^Negative    Leukocyte Esterase Urine Negative NEG^Negative    Source Midstream Urine     WBC Urine 1 0 - 5 /HPF    RBC Urine 53 (H) 0 - 2 /HPF    Mucous Urine Present (A) NEG^Negative /LPF     Treatments provided: Morphine, Dilaudid, Zofran, IVF, Flomax    Family Comments: Not present     OBS brochure/video discussed/provided to patient/family: Yes              Name of person given brochure if not patient:               Relationship to patient:     ED Medications:   Medications   HYDROmorphone (PF) (DILAUDID) injection 0.5 mg (0.5 mg Intravenous Given 6/13/19 0208)   ondansetron (ZOFRAN) injection 4 mg (has no administration in time range)   morphine (PF) injection 4 mg (4 mg Intravenous Given 6/13/19 0231)   ondansetron (ZOFRAN) injection 4 mg (4 mg Intravenous Given 6/13/19 0037)   morphine (PF) injection 4 mg (4 mg Intravenous Given 6/13/19 0037)   ketorolac (TORADOL) injection 15 mg (15 mg Intravenous Given 6/13/19 0036)   tamsulosin (FLOMAX) capsule 0.4 mg (0.4 mg Oral Given 6/13/19 0231)       Drips infusing?:  No    For the majority of the shift this patient was Green.   Interventions performed were .    Severe Sepsis OR Septic Shock Diagnosis Present: No    To be done/followed up on inpatient unit:      ED NURSE PHONE  NUMBER: